# Patient Record
(demographics unavailable — no encounter records)

---

## 2024-11-11 NOTE — PHYSICAL EXAM
[FreeTextEntry1] : Alert, NAD. Heart sounds NL. Neck FROM. PERRL, EOMI, face symmetric, hearing intact. Tone, power and gait are NL. No nystagmus or tremor.

## 2024-11-11 NOTE — CONSULT LETTER
[Dear  ___] : Dear  [unfilled], [Please see my note below.] : Please see my note below. [Sincerely,] : Sincerely, [FreeTextEntry1] : This is an update on ISAIAS OTOOLE  who I saw in the office today for a follow up. This is continuing active treatment of an existing pt. [FreeTextEntry3] : Dr Dixon

## 2024-11-11 NOTE — DISCUSSION/SUMMARY
[FreeTextEntry1] : ADHD and LD. Advised pt see child psychiatrist for pharmacotherapy in conjunction with ongoing behavioral therapy by a child psychologist. Will get EEG. RTO prn. Note sent to Dr Cooper(PCP). Total clinician time spent on 11/11/2024 is 42 minutes including preparing to see the patient, obtaining and/or reviewing and confirming history, performing a medically necessary and appropriate examination, counseling and educating the patient and/or family, documenting clinical information in the EHR and communicating and/or referring to other healthcare professionals.

## 2024-11-11 NOTE — HISTORY OF PRESENT ILLNESS
[FreeTextEntry1] : 8 yr old male last seen on 2/20/2023 with worsening pattern of behavioral outbursts, tantrums, problems with self-control and poor focusing. He is also very anxious, gets weekly therapy. Neurological workup was advised but not done yet. Mom provided scholastic records and BW which I reviewed. BW done on 11/1/24 showed NL TFTs and -ve ASLO titer. Neuropsych evaluation done 9/27/23 (Dr. Zavala) concluded comorbid diagnoses of ADHD, Specific Reading Disorder and Disorder of Written Expression. Recommended accommodations include SETSS support, 100% extra time to complete tests, separate testing location, use of an unlabeled graphic organizer for tests requiring a written response, tests read to him 1 on 1, focus breaks, preferential seating, OT for fine motor tasks and assistive technology assessment. Latest school team IEP was done on 2/19/24, providing SETSS and OT.  Pt currently in an ICT 3rd grade keeping up with his grades so far. The current concern by the parents if the pt's inability to control his behavior at home. He is very impulsive, gets frustrated very easily and has a complete meltdown when overwhelmed, crying and tantrumming like an infant. Walked and talked on time. Mom is a special . FT C/S no cx. PMH -ve. NKA. On no meds. FMH -ve sz/ASD.

## 2025-04-18 NOTE — HISTORY OF PRESENT ILLNESS
[FreeTextEntry1] : Robin presents to the office for continued management of his ADHD. He was previously diagnosed with ADHD 2 years ago by Dr. Dixon. He underwent neuropsychology testing which confirmed the diagnosis of ADHD- Combined Type. He is currently in 3rd grade at [employer]. He has an IEP for ADHD. He is in an ICT-type classroom and receives accommodations including extra time for testing, occupational therapy services and counselling services. Academically, he does well in social studies and science. He struggles with reading comprehension. He is unable to sit still for long periods of time. He is fidgety. He can be impulsive and call out answers. He tends to rush through his tests and assignments and can make careless mistakes. His desk and backpack are disorganized. He needs redirection from his teachers often. He loses focus when he is not directly engaged. He is easily distracted by his classmates and his surroundings. He tends to forget to bring home important reminders, assignments and books.  At home, Robin requires an extra amount of time to complete his homework. His room is messy. Behavioral modifications have been implemented at home including rewards systems. He tends to get emotional outbursts, and he is easily triggered. He gets overwhelmed and frustrated easily. He lacks confidence in himself. He loves to play baseball. There are times when he is distracted during practice and games.

## 2025-04-18 NOTE — ASSESSMENT
[FreeTextEntry1] : Robin is an 9yo with clinical history meets DSM-V criteria for the diagnosis of ADHD- Hyperactive- Impulsive Type. Behavior modification practices are already in place in school as part of his IEP including more time to complete tests and assignments. The classroom size is small. The next step in managing his ADHD would be to initiate medication.  - Start dexmethylphenidate 5mg. Indication, duration, breaks and potential side effects reviewed. - Follow up in 1 month.

## 2025-05-27 NOTE — HISTORY OF PRESENT ILLNESS
[FreeTextEntry1] : Robin presents in follow up of his ADHD. Since last visit, dexmethylphenidate was initiated to manage symptoms. Grades have improved to 80s-90s overall. His behaviors in school have improved significantly. He is compliant with medication and denies side effects.

## 2025-05-27 NOTE — ASSESSMENT
[FreeTextEntry1] : Robin is an 9yo with hx of ADHD. No dose adjustments required at this time. Break from medication over summer. Follow up in 3 months.